# Patient Record
Sex: FEMALE | URBAN - METROPOLITAN AREA
[De-identification: names, ages, dates, MRNs, and addresses within clinical notes are randomized per-mention and may not be internally consistent; named-entity substitution may affect disease eponyms.]

---

## 2019-09-27 ENCOUNTER — IMPORTED ENCOUNTER (OUTPATIENT)
Dept: URBAN - METROPOLITAN AREA CLINIC 9 | Facility: CLINIC | Age: 59
End: 2019-09-27

## 2019-11-14 NOTE — PATIENT DISCUSSION
Recommend Mini lower lift- Follow old incision lines, +platysmaplasty, post-tragel, SMAS to NLF/ML/Cheeks L>R  (discussed risks and benefits of sx. .. ).

## 2021-07-28 NOTE — PATIENT DISCUSSION
Symptoms of Retinal tear and detachment reviewed. Patient understands to call immediately with any such symptoms. no

## 2021-07-28 NOTE — PATIENT DISCUSSION
August 24, 2020      Guevara Elmore MD  [address] [facility] [address]  [facility] [address]           [facility] [place] [id number] [facility] [address]  [facility] [address]  Phone: [phone]  Fax: [phone]          Patient: Deepthi Jamison   MR Number: [id number]   YOB: 1944   Date of Visit: 8/24/2020       Dear Dr. Guevara Elmore:    Thank you for referring Deepthi Jamison to me for evaluation. Attached you will find relevant portions of my assessment and plan of care.    If you have questions, please do not hesitate to call me. I look forward to following Deepthi Jamison along with you.    Sincerely,    Zahra Tan MD    Enclosure  CC:  No Recipients    If you would like to receive this communication electronically, please contact [email or web address] or [phone] to request more information on [employer] Link access.    For providers and/or their staff who would like to refer a patient to [facility], please contact us through our one-stop-shop provider referral line, [facility], at [phone].    If you feel you have received this communication in error or would no longer like to receive these types of communications, please e-mail [email or web address]          OCT MAC OU done today; results reviewed with patient.

## 2021-10-16 ASSESSMENT — TONOMETRY
OS_IOP_MMHG: 14
OD_IOP_MMHG: 15

## 2021-10-16 ASSESSMENT — VISUAL ACUITY
OD_CC: 20/30 SN
OS_CC: 20/30 SN

## 2023-01-30 ENCOUNTER — POST-OP (OUTPATIENT)
Dept: URBAN - METROPOLITAN AREA CLINIC 9 | Facility: CLINIC | Age: 63
End: 2023-01-30

## 2023-01-30 DIAGNOSIS — Z98.890: ICD-10-CM

## 2023-01-30 PROCEDURE — 99024 POSTOP FOLLOW-UP VISIT: CPT
